# Patient Record
Sex: FEMALE | URBAN - METROPOLITAN AREA
[De-identification: names, ages, dates, MRNs, and addresses within clinical notes are randomized per-mention and may not be internally consistent; named-entity substitution may affect disease eponyms.]

---

## 2020-10-28 ENCOUNTER — APPOINTMENT (RX ONLY)
Dept: URBAN - METROPOLITAN AREA CLINIC 151 | Facility: CLINIC | Age: 45
Setting detail: DERMATOLOGY
End: 2020-10-28

## 2020-10-28 DIAGNOSIS — L65.8 OTHER SPECIFIED NONSCARRING HAIR LOSS: ICD-10-CM

## 2020-10-28 PROCEDURE — ? DIAGNOSIS COMMENT

## 2020-10-28 PROCEDURE — ? PRESCRIPTION

## 2020-10-28 PROCEDURE — ? COUNSELING

## 2020-10-28 PROCEDURE — 99203 OFFICE O/P NEW LOW 30 MIN: CPT | Mod: 95

## 2020-10-28 RX ORDER — SPIRONOLACTONE 50 MG/1
TABLET, FILM COATED ORAL
Qty: 120 | Refills: 1 | Status: ERX | COMMUNITY
Start: 2020-10-28

## 2020-10-28 RX ADMIN — SPIRONOLACTONE: 50 TABLET, FILM COATED ORAL at 00:00

## 2020-10-28 ASSESSMENT — LOCATION SIMPLE DESCRIPTION DERM: LOCATION SIMPLE: RIGHT SCALP

## 2020-10-28 ASSESSMENT — LOCATION ZONE DERM: LOCATION ZONE: SCALP

## 2020-10-28 ASSESSMENT — LOCATION DETAILED DESCRIPTION DERM: LOCATION DETAILED: RIGHT MEDIAL FRONTAL SCALP

## 2020-10-28 NOTE — HPI: OTHER
Condition:: Hair thinning
Please Describe Your Condition:: Pt’s hair has been thinning for the past 3 years. Pt notes she has been under more stress over the past 3 years and noticed rapid hair loss. Her GP recommenced biotin and a dermatologist a few years ago recommended Rogaine, her GYN recommended seeing Dr. Mccarthy. Her GP thought it might be TE. She has had bloodwork done and thyroid levels are normal and iron was slightly so she started taking iron. She switched from ortho tri-cycline low to a copper IUD

## 2020-10-28 NOTE — PROCEDURE: COUNSELING
Patient Specific Counseling (Will Not Stick From Patient To Patient): - Recommends Rogaine foam twice a day\\n- Discussed different treatment options such as Spironolactone, Propecia and PRP
Detail Level: Detailed

## 2020-10-28 NOTE — PROCEDURE: DIAGNOSIS COMMENT
Comment: Sreedhar. Rx spironolactone 50mg QD for two weeks, if tolerated well increased to 50mg BID
Detail Level: Simple

## 2020-12-16 ENCOUNTER — APPOINTMENT (RX ONLY)
Dept: URBAN - METROPOLITAN AREA CLINIC 151 | Facility: CLINIC | Age: 45
Setting detail: DERMATOLOGY
End: 2020-12-16

## 2020-12-16 DIAGNOSIS — D22 MELANOCYTIC NEVI: ICD-10-CM

## 2020-12-16 DIAGNOSIS — L81.6 OTHER DISORDERS OF DIMINISHED MELANIN FORMATION: ICD-10-CM

## 2020-12-16 DIAGNOSIS — L65.8 OTHER SPECIFIED NONSCARRING HAIR LOSS: ICD-10-CM

## 2020-12-16 PROBLEM — D48.5 NEOPLASM OF UNCERTAIN BEHAVIOR OF SKIN: Status: ACTIVE | Noted: 2020-12-16

## 2020-12-16 PROCEDURE — ? COUNSELING

## 2020-12-16 PROCEDURE — ? PRESCRIPTION

## 2020-12-16 PROCEDURE — 99213 OFFICE O/P EST LOW 20 MIN: CPT | Mod: 95

## 2020-12-16 PROCEDURE — ? OBSERVATION AND MEASURE

## 2020-12-16 PROCEDURE — ? DIAGNOSIS COMMENT

## 2020-12-16 RX ORDER — SPIRONOLACTONE 50 MG/1
TABLET, FILM COATED ORAL
Qty: 270 | Refills: 3 | Status: ERX

## 2020-12-16 ASSESSMENT — LOCATION DETAILED DESCRIPTION DERM
LOCATION DETAILED: RIGHT MEDIAL FRONTAL SCALP
LOCATION DETAILED: POSTERIOR MID-PARIETAL SCALP

## 2020-12-16 ASSESSMENT — LOCATION SIMPLE DESCRIPTION DERM
LOCATION SIMPLE: POSTERIOR SCALP
LOCATION SIMPLE: RIGHT SCALP

## 2020-12-16 ASSESSMENT — LOCATION ZONE DERM: LOCATION ZONE: SCALP

## 2020-12-16 NOTE — HPI: DISCOLORATION (HYPERPIGMENTATION)
Is This A New Presentation, Or A Follow-Up?: Discoloration
How Severe Is It?: mild
Additional History: Pt reports the hypopigmentation is from a Retin a reaction

## 2020-12-16 NOTE — PROCEDURE: OBSERVATION
Body Location Override (Optional - Billing Will Still Be Based On Selected Body Map Location If Applicable): scalp
Detail Level: Detailed
Size Of Lesion: 2mm
Morphology Per Location (Optional): Brown macule, evenly pigmented

## 2020-12-16 NOTE — PROCEDURE: DIAGNOSIS COMMENT
Comment: TeleDerm. Pt has increased larry to 100mg. Discussed watching blood pressure and as long as she is not feeling dizzy then we can increase to 150mg. Discussed if 150mg is too much, then she decrease to 100mg. Told pt that if her hair washing schedule changes, more hair will come out. Rogaine can be used once daily, but try to use it BID all over the scalp if possible for best results. Pt has Latisse and recommended she uses that on eyebrows.
Detail Level: Simple
Comment: Told pt nothing of concern, but to keep an eye on it for any changes.
Comment: Pt reports the hypopigmented patch is from retin a in Dec 2019

## 2020-12-16 NOTE — PROCEDURE: COUNSELING
Patient Specific Counseling (Will Not Stick From Patient To Patient): -discussed Nioxin shampoo can be used
Detail Level: Detailed